# Patient Record
Sex: MALE | Race: WHITE | Employment: PART TIME | URBAN - METROPOLITAN AREA
[De-identification: names, ages, dates, MRNs, and addresses within clinical notes are randomized per-mention and may not be internally consistent; named-entity substitution may affect disease eponyms.]

---
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
## 2021-07-06 ENCOUNTER — OFFICE VISIT (OUTPATIENT)
Dept: FAMILY MEDICINE | Facility: CLINIC | Age: 21
End: 2021-07-06
Payer: COMMERCIAL

## 2021-07-06 VITALS
TEMPERATURE: 97.9 F | OXYGEN SATURATION: 97 % | SYSTOLIC BLOOD PRESSURE: 104 MMHG | HEIGHT: 74 IN | WEIGHT: 176 LBS | DIASTOLIC BLOOD PRESSURE: 72 MMHG | BODY MASS INDEX: 22.59 KG/M2 | HEART RATE: 73 BPM

## 2021-07-06 DIAGNOSIS — H10.13 ALLERGIC CONJUNCTIVITIS, BILATERAL: ICD-10-CM

## 2021-07-06 DIAGNOSIS — J01.00 ACUTE NON-RECURRENT MAXILLARY SINUSITIS: Primary | ICD-10-CM

## 2021-07-06 PROCEDURE — 99213 OFFICE O/P EST LOW 20 MIN: CPT | Performed by: NURSE PRACTITIONER

## 2021-07-06 RX ORDER — OLOPATADINE HYDROCHLORIDE 1 MG/ML
1 SOLUTION/ DROPS OPHTHALMIC 2 TIMES DAILY
Qty: 5 ML | Refills: 0 | Status: SHIPPED | OUTPATIENT
Start: 2021-07-06

## 2021-07-06 ASSESSMENT — MIFFLIN-ST. JEOR: SCORE: 1883.08

## 2021-07-06 NOTE — PROGRESS NOTES
Assessment & Plan     Acute non-recurrent maxillary sinusitis  - amoxicillin-clavulanate (AUGMENTIN) 875-125 MG tablet; Take 1 tablet by mouth 2 times daily for 10 days    Allergic conjunctivitis, bilateral  - olopatadine (PATANOL) 0.1 % ophthalmic solution; Place 1 drop into both eyes 2 times daily    ,--Saline nasal spray or a cely pot can be helpful in clearing out the sinuses and making them feel better. Also, sleep propped up on an extra pillow to help with drainage.  --Using a humidifier at night will also add moisture to the air and help with symptoms.  --Guaifenesen(Mucinex) can be used to help loosen and thin mucus. Take as directed.  --Avoid use of other over the counter medications, ideally we want mucus to drain to prevent further infection from developing.  --Ibuprofen or Tylenol as directed is ok for headache or sinus pressure.     I spent a total of 9 minutes on the day of the visit.   Time spent doing chart review, history and exam, documentation and further activities per the note       Tobacco Cessation:   reports that he has been smoking other. He has never used smokeless tobacco.      See Patient Instructions    Return in about 1 week (around 7/13/2021) for symptoms failing to improve or worsening.    Dimple Dave CNP  [facility] is a 20 year old who presents for the following health issues     HPI     Allergies  Onset/Duration:   Symptoms:   Nasal congestion: YES  Sneezing: YES  Red, itchy eyes: YES  Progression of Symptoms: worse  Accompanying Signs & Symptoms:  Cough: YES  Wheezing: no  Rash: no  Sinus/facial pain: YES  History:   Is it seasonal: usually bothers him in the spring but never usually this bad  History of Asthma: no  Has allergy testing been done: no  Precipitating factors:   None  Alleviating factors:  None  Therapies tried and outcome: has tried claritin, benadryl         Review of Systems   Constitutional, HEENT,  "cardiovascular, pulmonary, GI, , musculoskeletal, neuro, skin, endocrine and psych systems are negative, except as otherwise noted.      Objective    /72 (BP Location: Right arm, Cuff Size: Adult Regular)   Pulse 73   Temp 97.9  F (36.6  C) (Tympanic)   Ht 1.88 m (6' 2\")   Wt 79.8 kg (176 lb)   SpO2 97%   BMI 22.60 kg/m    Body mass index is 22.6 kg/m .  Physical Exam   GENERAL: healthy, alert and no distress  EYES: Eyes grossly normal to inspection and conjunctiva/corneas- conjunctival injection both eyes, lower eyelids swollen  HENT: normal cephalic/atraumatic, ear canals and TM's normal, nasal mucosa edematous , oropharynx clear, oral mucous membranes moist sinus tenderness frontal bilateral  NECK: bilateral anterior cervical adenopathy, no asymmetry, masses, or scars and thyroid normal to palpation  RESP: lungs clear to auscultation - no rales, rhonchi or wheezes  CV: regular rate and rhythm, normal S1 S2, no S3 or S4, no murmur, click or rub, no peripheral edema and peripheral pulses strong  MS: no gross musculoskeletal defects noted, no edema              TATIANNA Sinclair     [address] [address]  [email or web address]   Office: [phone]                 "

## 2021-07-06 NOTE — PATIENT INSTRUCTIONS
Once done with antibiotic. Recommend taking Claritin once daily for preventative measures until hard frost.     Flonase 50 mcg dose 2 sprays each nostril daily.

## (undated) DEVICE — ESU CLEANER TIP 31142717

## (undated) DEVICE — SOL WATER IRRIG 1000ML BOTTLE 2F7114

## (undated) DEVICE — Device

## (undated) DEVICE — POSITIONER ARMBOARD FOAM 1PAIR LF FP-ARMB1

## (undated) DEVICE — SPONGE LAP 18X18" X8435

## (undated) DEVICE — GLOVE PROTEXIS POWDER FREE 7.5 ORTHOPEDIC 2D73ET75

## (undated) DEVICE — SU VICRYL 2-0 CT-2 27" UND J269H

## (undated) DEVICE — PREP DURAPREP 26ML APL 8630

## (undated) DEVICE — GOWN XLG DISP 9545

## (undated) DEVICE — COVER CAMERA IN-LIGHT DISP LT-C02

## (undated) DEVICE — DRSG AQUACEL AG 3.5X9.75" HYDROFIBER 412011

## (undated) DEVICE — SUCTION MANIFOLD DORNOCH ULTRA CART UL-CL500

## (undated) DEVICE — SOL HYDROGEN PEROXIDE 3% 4OZ BOTTLE F0010

## (undated) DEVICE — DRAPE C-ARM OEC MINI VIEW 6800   00-901917-01

## (undated) DEVICE — LINEN ORTHO PACK 5446

## (undated) DEVICE — GLOVE PROTEXIS W/NEU-THERA 7.5  2D73TE75

## (undated) DEVICE — LIGHT HANDLE X1 31140133

## (undated) DEVICE — IMM KIT SHOULDER STABILIZATION 7210573

## (undated) DEVICE — GLOVE PROTEXIS W/NEU-THERA 7.0  2D73TE70

## (undated) DEVICE — ESU ELEC NDL 1" E1552

## (undated) DEVICE — GLOVE PROTEXIS POWDER FREE 7.0 ORTHOPEDIC 2D73ET70

## (undated) DEVICE — RESTRAINT LIMB HOLDER ANKLE/WRIST FOAM W/QUICK RELEASE 2533

## (undated) DEVICE — ESU PENCIL W/SMOKE EVAC NEPTUNE STRYKER 0703-046-000

## (undated) DEVICE — BLADE CLIPPER SGL USE 9680

## (undated) DEVICE — DRAPE POUCH INSTRUMENT 1018

## (undated) DEVICE — IMM KIT SHOULDER TMAX MASK FACE 7210559

## (undated) DEVICE — STRAP KNEE/BODY 31143004

## (undated) DEVICE — ESU GROUND PAD ADULT W/CORD E7507

## (undated) DEVICE — BRUSH SURGICAL SCRUB W/PCMX 4457A

## (undated) DEVICE — SU MONOCRYL 2-0 SH 27" UND Y417H

## (undated) DEVICE — SOL NACL 0.9% IRRIG 1000ML BOTTLE 2F7124

## (undated) DEVICE — DRAPE U-DRAPE 1015NSD NON-STERILE

## (undated) DEVICE — LINEN TOWEL PACK X5 5464

## (undated) DEVICE — SYR BULB IRRIG 50ML LATEX FREE 0035280

## (undated) RX ORDER — KETOROLAC TROMETHAMINE 30 MG/ML
INJECTION, SOLUTION INTRAMUSCULAR; INTRAVENOUS
Status: DISPENSED
Start: 2019-06-26

## (undated) RX ORDER — FENTANYL CITRATE 50 UG/ML
INJECTION, SOLUTION INTRAMUSCULAR; INTRAVENOUS
Status: DISPENSED
Start: 2019-06-26

## (undated) RX ORDER — HYDROMORPHONE HYDROCHLORIDE 1 MG/ML
INJECTION, SOLUTION INTRAMUSCULAR; INTRAVENOUS; SUBCUTANEOUS
Status: DISPENSED
Start: 2019-06-26

## (undated) RX ORDER — BUPIVACAINE HYDROCHLORIDE 2.5 MG/ML
INJECTION, SOLUTION EPIDURAL; INFILTRATION; INTRACAUDAL
Status: DISPENSED
Start: 2019-06-26

## (undated) RX ORDER — PROPOFOL 10 MG/ML
INJECTION, EMULSION INTRAVENOUS
Status: DISPENSED
Start: 2019-06-26

## (undated) RX ORDER — CEFAZOLIN SODIUM 2 G/100ML
INJECTION, SOLUTION INTRAVENOUS
Status: DISPENSED
Start: 2019-06-26

## (undated) RX ORDER — ONDANSETRON 2 MG/ML
INJECTION INTRAMUSCULAR; INTRAVENOUS
Status: DISPENSED
Start: 2019-06-26

## (undated) RX ORDER — LIDOCAINE HYDROCHLORIDE 20 MG/ML
INJECTION, SOLUTION EPIDURAL; INFILTRATION; INTRACAUDAL; PERINEURAL
Status: DISPENSED
Start: 2019-06-26